# Patient Record
Sex: FEMALE | Race: WHITE | NOT HISPANIC OR LATINO | Employment: OTHER | URBAN - METROPOLITAN AREA
[De-identification: names, ages, dates, MRNs, and addresses within clinical notes are randomized per-mention and may not be internally consistent; named-entity substitution may affect disease eponyms.]

---

## 2023-05-27 ENCOUNTER — HOSPITAL ENCOUNTER (EMERGENCY)
Facility: CLINIC | Age: 58
Discharge: HOME | End: 2023-05-27
Attending: PHYSICIAN ASSISTANT | Admitting: PHYSICIAN ASSISTANT
Payer: COMMERCIAL

## 2023-05-27 ENCOUNTER — APPOINTMENT (OUTPATIENT)
Dept: GENERAL RADIOLOGY | Facility: CLINIC | Age: 58
End: 2023-05-27
Attending: PHYSICIAN ASSISTANT
Payer: COMMERCIAL

## 2023-05-27 VITALS
OXYGEN SATURATION: 98 % | SYSTOLIC BLOOD PRESSURE: 171 MMHG | TEMPERATURE: 99 F | DIASTOLIC BLOOD PRESSURE: 80 MMHG | RESPIRATION RATE: 16 BRPM | HEART RATE: 63 BPM
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
## 2023-05-27 PROCEDURE — G0463 HOSPITAL OUTPT CLINIC VISIT: HCPCS | Performed by: PHYSICIAN ASSISTANT

## 2023-05-27 PROCEDURE — 12001 RPR S/N/AX/GEN/TRNK 2.5CM/<: CPT | Performed by: PHYSICIAN ASSISTANT

## 2023-05-27 PROCEDURE — 99203 OFFICE O/P NEW LOW 30 MIN: CPT | Mod: 25 | Performed by: PHYSICIAN ASSISTANT

## 2023-05-27 PROCEDURE — 73140 X-RAY EXAM OF FINGER(S): CPT | Mod: LT

## 2023-05-27 RX ORDER — CETIRIZINE HYDROCHLORIDE 10 MG/1
1 TABLET ORAL DAILY
COMMUNITY
Start: 2021-10-04

## 2023-05-27 RX ORDER — VENLAFAXINE HYDROCHLORIDE 75 MG/1
75 CAPSULE, EXTENDED RELEASE ORAL
COMMUNITY
Start: 2023-04-05

## 2023-05-27 RX ORDER — OXYCODONE AND ACETAMINOPHEN 5; 325 MG/1; MG/1
1 TABLET ORAL
COMMUNITY
Start: 2021-11-23

## 2023-05-27 RX ORDER — FOLIC ACID 1 MG/1
1 TABLET ORAL
COMMUNITY
Start: 2023-04-05

## 2023-05-27 ASSESSMENT — ACTIVITIES OF DAILY LIVING (ADL): ADLS_ACUITY_SCORE: 35

## 2023-05-27 NOTE — ED PROVIDER NOTES
History     Chief Complaint   Patient presents with     Dog Bite     Left index finger. Patient's dog bit patient.     HPI  Talisha Acuna is a 57 year old female who presents to urgent care with concern over dog bite to her left index finger which occurred just prior to arrival.  Patient reports that she was attempting to give her dog a treat when animal accidentally bit her finger.  She has moderate pain in the area.  No concern for foreign body embedded in the wound.  No distal numbness or paresthesias.  He is unsure the date of her last tetanus vaccine however states that animal is up-to-date with rabies vaccination.    Allergies:  No Known Allergies    Problem List:    There are no problems to display for this patient.       Past Medical History:    No past medical history on file.    Past Surgical History:    No past surgical history on file.    Family History:    No family history on file.    Social History:  Marital Status:  Single [1]        Medications:    amoxicillin-clavulanate (AUGMENTIN) 875-125 MG tablet  cetirizine (ZYRTEC) 10 MG tablet  folic acid (FOLVITE) 1 MG tablet  oxyCODONE-acetaminophen (PERCOCET) 5-325 MG tablet  propranolol SR BEADS (INDREAL XL) 120 MG 24 hr capsule  venlafaxine (EFFEXOR XR) 75 MG 24 hr capsule      Review of Systems  CONSTITUTIONAL:NEGATIVE for fever, chills, change in weight  INTEGUMENTARY/SKIN: POSITIVE for laceration NEGATIVE for erythema, worrisome rashes   RESP:NEGATIVE for significant cough or SOB  MUSCULOSKELETAL: POSITIVE  for left index finger pain, laceration and NEGATIVE for other new onset arthralgias or myalgias   NEURO: NEGATIVE for numbness, paresthesias   Physical Exam   BP: (!) 171/80  Pulse: 63  Temp: 99  F (37.2  C)  Resp: 16  SpO2: 98 %  Physical Exam  Constitutional:       General: She is not in acute distress.     Appearance: She is not ill-appearing or toxic-appearing.   HENT:      Head: Normocephalic and atraumatic.   Cardiovascular:      Pulses:            Radial pulses are 2+ on the left side.   Musculoskeletal:      Left wrist: Normal.      Left hand: Laceration and tenderness present. No swelling or deformity. Normal sensation. There is no disruption of two-point discrimination. Normal capillary refill. Normal pulse.   Skin:     General: Skin is warm and dry.      Capillary Refill: Capillary refill takes less than 2 seconds.      Findings: Ecchymosis and laceration (1 cm volar srface of left index finger) present. No abrasion, erythema or rash.   Neurological:      Mental Status: She is alert.      Sensory: No sensory deficit.       ED Course           [facility]    -Laceration Repair    Date/Time: 5/28/2023 3:34 PM    Performed by: Evelyn Bates PA-C  Authorized by: Evelyn Bates PA-C    Risks, benefits and alternatives discussed.    LACERATION DETAILS     Location:  Finger    Finger location:  L index finger    Length (cm):  1    REPAIR TYPE:     Repair type:  Simple      EXPLORATION:     Hemostasis achieved with:  Direct pressure    Wound extent: no nerve damage, no tendon damage, no underlying fracture and no vascular damage      TREATMENT:     Area cleansed with:  Hibiclens    Amount of cleaning:  Standard    Irrigation solution:  Sterile water and sterile saline    Irrigation volume:  150    Irrigation method:  Syringe    SKIN REPAIR     Repair method:  Sutures    Suture size:  5-0    Suture material:  Nylon    Suture technique:  Simple interrupted    Number of sutures:  2    APPROXIMATION     Approximation:  Loose    POST-PROCEDURE DETAILS     Dressing:  Antibiotic ointment        PROCEDURE    Patient Tolerance:  Patient tolerated the procedure well with no immediate complications         Critical Care time:  none           Results for orders placed or performed during the hospital encounter of 05/27/23   Fingers XR, 2-3 views, left     Status: None    Narrative    EXAM: XR INDEX FINGER LEFT G/E 2 VIEWS  LOCATION:   [facility]  DATE/TIME: 5/27/2023 9:26 AM CDT    INDICATION: dog bite assess for bony abnormality, foreign body  COMPARISON: None.      Impression    IMPRESSION: Normal alignment. Mild cortical irregularity of the second metacarpal head is probably not acute, provided there is not an adjacent puncture wound. 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 No radiopaque foreign body.         Medications - No data to display     Review of care everywhere records show TDAP 4/16/2018    Assessments & Plan (with Medical Decision Making)     I have reviewed the nursing notes.  I have reviewed the findings, diagnosis, plan and need for follow up with the patient.     Discharge Medication List as of 5/27/2023  9:59 AM        Final diagnoses:   Dog bite, initial encounter     57-year-old female presents to urgent care with concern over dog bite to her left index finger which occurred from her home patch prior to arrival while she was attempting to feed the dog treat.  Physical exam findings significant for 1 cm subcutaneous laceration to the volar surface of her left index finger adjacent to the PIP joint.  She did have x-ray of her finger which was negative for acute fracture.  Mild cortical irregularity of the second metacarpal head and that is probably not acute per radiology report and mild degenerative arthritis of the PIP joint.  No radiopaque foreign body.  I discussed her/benefits of primary versus secondary closure given mechanism of injury from animal bite and location of wound, patient did elect to proceed with suture placement.  This wound will continuously open up when she moves her finger.  She tolerated placement of 2 5-0 nylon simple interrupted sutures.  She was discharged home stable with prescription for Augmentin instructions for suture removal in 7 to 10 days.  Suture care instructions, signs of infection, worrisome reasons to return to the ER/UC sooner discussed.     Disclaimer:  This note consists of symbols derived from keyboarding, dictation, and/or voice recognition software. As a result, there may be errors in the script that have gone undetected.  Please consider this when interpreting information found in the chart.      5/27/2023   [facility] EMERGENCY DEPT     Evelyn Bates PA-C  05/28/23 1537